# Patient Record
Sex: MALE | Race: WHITE | NOT HISPANIC OR LATINO | Employment: STUDENT | URBAN - METROPOLITAN AREA
[De-identification: names, ages, dates, MRNs, and addresses within clinical notes are randomized per-mention and may not be internally consistent; named-entity substitution may affect disease eponyms.]

---

## 2020-10-19 ENCOUNTER — LAB VISIT (OUTPATIENT)
Dept: LAB | Facility: HOSPITAL | Age: 20
End: 2020-10-19
Attending: STUDENT IN AN ORGANIZED HEALTH CARE EDUCATION/TRAINING PROGRAM
Payer: COMMERCIAL

## 2020-10-19 ENCOUNTER — OFFICE VISIT (OUTPATIENT)
Dept: FAMILY MEDICINE | Facility: CLINIC | Age: 20
End: 2020-10-19
Payer: COMMERCIAL

## 2020-10-19 VITALS
TEMPERATURE: 98 F | OXYGEN SATURATION: 97 % | RESPIRATION RATE: 18 BRPM | HEIGHT: 64 IN | WEIGHT: 224.44 LBS | BODY MASS INDEX: 38.32 KG/M2 | SYSTOLIC BLOOD PRESSURE: 130 MMHG | DIASTOLIC BLOOD PRESSURE: 84 MMHG | HEART RATE: 84 BPM

## 2020-10-19 DIAGNOSIS — Z02.5 SPORTS PHYSICAL: Primary | ICD-10-CM

## 2020-10-19 DIAGNOSIS — Z02.5 SPORTS PHYSICAL: ICD-10-CM

## 2020-10-19 DIAGNOSIS — Q93.89 JACOBSEN SYNDROME: ICD-10-CM

## 2020-10-19 DIAGNOSIS — F88 GLOBAL DEVELOPMENTAL DELAY: Chronic | ICD-10-CM

## 2020-10-19 DIAGNOSIS — E34.329 SHORT STATURE ASSOCIATED WITH GENETIC DISORDER: Chronic | ICD-10-CM

## 2020-10-19 LAB
ALBUMIN SERPL BCP-MCNC: 4 G/DL (ref 3.5–5.2)
ALP SERPL-CCNC: 66 U/L (ref 55–135)
ALT SERPL W/O P-5'-P-CCNC: 17 U/L (ref 10–44)
ANION GAP SERPL CALC-SCNC: 7 MMOL/L (ref 8–16)
AST SERPL-CCNC: 23 U/L (ref 10–40)
BASOPHILS # BLD AUTO: 0.01 K/UL (ref 0–0.2)
BASOPHILS NFR BLD: 0.1 % (ref 0–1.9)
BILIRUB SERPL-MCNC: 0.6 MG/DL (ref 0.1–1)
BUN SERPL-MCNC: 12 MG/DL (ref 6–20)
CALCIUM SERPL-MCNC: 9.7 MG/DL (ref 8.7–10.5)
CHLORIDE SERPL-SCNC: 105 MMOL/L (ref 95–110)
CHOLEST SERPL-MCNC: 172 MG/DL (ref 120–199)
CHOLEST/HDLC SERPL: 3.4 {RATIO} (ref 2–5)
CO2 SERPL-SCNC: 29 MMOL/L (ref 23–29)
CREAT SERPL-MCNC: 1 MG/DL (ref 0.5–1.4)
DIFFERENTIAL METHOD: ABNORMAL
EOSINOPHIL # BLD AUTO: 0 K/UL (ref 0–0.5)
EOSINOPHIL NFR BLD: 0.5 % (ref 0–8)
ERYTHROCYTE [DISTWIDTH] IN BLOOD BY AUTOMATED COUNT: 12.7 % (ref 11.5–14.5)
EST. GFR  (AFRICAN AMERICAN): >60 ML/MIN/1.73 M^2
EST. GFR  (NON AFRICAN AMERICAN): >60 ML/MIN/1.73 M^2
GLUCOSE SERPL-MCNC: 84 MG/DL (ref 70–110)
HCT VFR BLD AUTO: 49.7 % (ref 40–54)
HDLC SERPL-MCNC: 50 MG/DL (ref 40–75)
HDLC SERPL: 29.1 % (ref 20–50)
HGB BLD-MCNC: 15.8 G/DL (ref 14–18)
IMM GRANULOCYTES # BLD AUTO: 0.01 K/UL (ref 0–0.04)
IMM GRANULOCYTES NFR BLD AUTO: 0.1 % (ref 0–0.5)
INR PPP: 0.9 (ref 0.8–1.2)
LDLC SERPL CALC-MCNC: 76.2 MG/DL (ref 63–159)
LYMPHOCYTES # BLD AUTO: 1.3 K/UL (ref 1–4.8)
LYMPHOCYTES NFR BLD: 14.9 % (ref 18–48)
MCH RBC QN AUTO: 28.3 PG (ref 27–31)
MCHC RBC AUTO-ENTMCNC: 31.8 G/DL (ref 32–36)
MCV RBC AUTO: 89 FL (ref 82–98)
MONOCYTES # BLD AUTO: 0.4 K/UL (ref 0.3–1)
MONOCYTES NFR BLD: 5.2 % (ref 4–15)
NEUTROPHILS # BLD AUTO: 6.8 K/UL (ref 1.8–7.7)
NEUTROPHILS NFR BLD: 79.2 % (ref 38–73)
NONHDLC SERPL-MCNC: 122 MG/DL
NRBC BLD-RTO: 0 /100 WBC
PLATELET # BLD AUTO: 219 K/UL (ref 150–350)
PMV BLD AUTO: 11.8 FL (ref 9.2–12.9)
POTASSIUM SERPL-SCNC: 4.1 MMOL/L (ref 3.5–5.1)
PROT SERPL-MCNC: 7.2 G/DL (ref 6–8.4)
PROTHROMBIN TIME: 10.3 SEC (ref 9–12.5)
RBC # BLD AUTO: 5.59 M/UL (ref 4.6–6.2)
SODIUM SERPL-SCNC: 141 MMOL/L (ref 136–145)
TRIGL SERPL-MCNC: 229 MG/DL (ref 30–150)
TSH SERPL DL<=0.005 MIU/L-ACNC: 1.12 UIU/ML (ref 0.4–4)
WBC # BLD AUTO: 8.54 K/UL (ref 3.9–12.7)

## 2020-10-19 PROCEDURE — 99999 PR PBB SHADOW E&M-NEW PATIENT-LVL IV: ICD-10-PCS | Mod: PBBFAC,,, | Performed by: STUDENT IN AN ORGANIZED HEALTH CARE EDUCATION/TRAINING PROGRAM

## 2020-10-19 PROCEDURE — 93005 EKG 12-LEAD: ICD-10-PCS | Mod: S$GLB,,, | Performed by: STUDENT IN AN ORGANIZED HEALTH CARE EDUCATION/TRAINING PROGRAM

## 2020-10-19 PROCEDURE — 80053 COMPREHEN METABOLIC PANEL: CPT

## 2020-10-19 PROCEDURE — 99203 OFFICE O/P NEW LOW 30 MIN: CPT | Mod: S$GLB,,, | Performed by: STUDENT IN AN ORGANIZED HEALTH CARE EDUCATION/TRAINING PROGRAM

## 2020-10-19 PROCEDURE — 99203 PR OFFICE/OUTPT VISIT, NEW, LEVL III, 30-44 MIN: ICD-10-PCS | Mod: S$GLB,,, | Performed by: STUDENT IN AN ORGANIZED HEALTH CARE EDUCATION/TRAINING PROGRAM

## 2020-10-19 PROCEDURE — 93005 ELECTROCARDIOGRAM TRACING: CPT | Mod: S$GLB,,, | Performed by: STUDENT IN AN ORGANIZED HEALTH CARE EDUCATION/TRAINING PROGRAM

## 2020-10-19 PROCEDURE — 84443 ASSAY THYROID STIM HORMONE: CPT

## 2020-10-19 PROCEDURE — 85610 PROTHROMBIN TIME: CPT

## 2020-10-19 PROCEDURE — 85025 COMPLETE CBC W/AUTO DIFF WBC: CPT

## 2020-10-19 PROCEDURE — 93010 ELECTROCARDIOGRAM REPORT: CPT | Mod: S$GLB,,, | Performed by: INTERNAL MEDICINE

## 2020-10-19 PROCEDURE — 36415 COLL VENOUS BLD VENIPUNCTURE: CPT | Mod: PO

## 2020-10-19 PROCEDURE — 99999 PR PBB SHADOW E&M-NEW PATIENT-LVL IV: CPT | Mod: PBBFAC,,, | Performed by: STUDENT IN AN ORGANIZED HEALTH CARE EDUCATION/TRAINING PROGRAM

## 2020-10-19 PROCEDURE — 93010 EKG 12-LEAD: ICD-10-PCS | Mod: S$GLB,,, | Performed by: INTERNAL MEDICINE

## 2020-10-19 PROCEDURE — 80061 LIPID PANEL: CPT

## 2020-10-19 NOTE — PROGRESS NOTES
[facility] MEDICINE CLINIC NOTE    Patient Name: Dameon Smith  YOB: 2000    PRESENTING HISTORY   Chief Complaint:   Chief Complaint   Patient presents with    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 Previously seen in Pediatrics.     Comes to clinic today with his mother    Hx Laura Syndrome- Unclear manifestations. May have had some mild thrombocytopenia with nosebleeds but this resolved.    Was getting Echos as child but stopped. Notes do not go back this far.    Has short stature and slow intellectual development     Attempting to join the [employer] and needs pre-employment physical. Went to Urgent Care but felt he needed more extensive evaluation so referred to me.     Graduated High School from [employer] in 2020.     Chronic body rash- present for years. No itching, pain. Applied topical steroid in past. Never saw dermatology    Chronic ear infections. Discussed having facial surgery, didn't want to at first.     Review of Systems   HENT: Negative for nosebleeds and sore throat.         +recurrent ear aches   Respiratory: Negative for cough and shortness of breath.    Cardiovascular: Negative for chest pain and palpitations.   Skin: Positive for rash. Negative for itching.         PAST HISTORY:     Past Medical History:   Diagnosis Date    Laura Syndrome     (see docx from 3/25/13 in media for overview), may have increased risk of bleeding/bruising       History reviewed. No pertinent surgical history.    Family History   Problem Relation Age of Onset    Hypertension Mother     Hypertension Father     Cancer Maternal Grandmother     Heart disease Maternal Grandfather     Hyperlipidemia Paternal Grandmother     Hypertension Paternal Grandfather        Social History     Socioeconomic History    Marital status: Single     Spouse name: Not on file    Number of children: Not on file     Years of education: Not on file    Highest education level: Not on file   Occupational History    Not on file   Social Needs    Financial resource strain: Not on file    Food insecurity     Worry: Not on file     Inability: Not on file    Transportation needs     Medical: Not on file     Non-medical: Not on file   Tobacco Use    Smoking status: Never Smoker    Smokeless tobacco: Never Used   Substance and Sexual Activity    Alcohol use: No    Drug use: No    Sexual activity: Never   Lifestyle    Physical activity     Days per week: Not on file     Minutes per session: Not on file    Stress: Not on file   Relationships    Social connections     Talks on phone: Not on file     Gets together: Not on file     Attends M[employer] service: Not on file     Active member of club or organization: Not on file     Attends meetings of clubs or organizations: Not on file     Relationship status: Not on file   Other Topics Concern    Not on file   Social History Narrative    PMH:Laura syndrome, normal cardiac eval, ECHO and ECG, delayed speech and short stature; RAD and wheezd in  and ; occasional OM last in 3/09, strep in . He had chickenpox    FH:AODM in PGGM, MGGM, renal calculi in sister, dad and PGM, MGF had IHD in his 40s and pancreatic disease,  at 52; PGF with IHD and CABG in 60s, father has HTN, MGGM had melanoma at 32, PGGM had breast cancer, MGGF had mesothelioma from asbestosis    SH:intact family, youngest of six siblings, no smokers, two cats       MEDICATIONS & ALLERGIES:     No current outpatient medications on file prior to visit.     Current Facility-Administered Medications on File Prior to Visit   Medication    [DISCONTINUED] ibuprofen tablet 400 mg       Review of patient's allergies indicates:  No Known Allergies    OBJECTIVE:   Vital Signs:  Vitals:    10/19/20 1315   BP: 130/84   Pulse: 84   Resp: 18   Temp: 97.7 °F (36.5 °C)   TempSrc: Temporal   SpO2: 97%   Weight:  "101.8 kg (224 lb 6.9 oz)   Height: 5' 4" (1.626 m)       No results found for this or any previous visit (from the past 24 hour(s)).      Physical Exam   Constitutional: He is oriented to person, place, and time.   Short stature. No distress. No diaphoresis. Well appearing   HENT:   Right Ear: External ear normal.   Left Ear: External ear normal.   Mouth/Throat: Oropharynx is clear and moist.   Craniofacial abnormalities. Deviated septum.    Eyes: Pupils are equal, round, and reactive to light. Conjunctivae and EOM are normal. No scleral icterus.   Neck: Normal range of motion. Neck supple. No thyromegaly present.   Cardiovascular: Normal rate, regular rhythm, normal heart sounds and intact distal pulses.   No murmur heard.  Pulmonary/Chest: Effort normal and breath sounds normal. No respiratory distress. He has no wheezes. He has no rales.   Abdominal: Soft. Bowel sounds are normal. He exhibits no distension. There is no abdominal tenderness.   Musculoskeletal: Normal range of motion.         General: No tenderness, deformity or edema.      Comments: Normal squat   Lymphadenopathy:     He has no cervical adenopathy.   Neurological: He is alert and oriented to person, place, and time. He displays normal reflexes. No cranial nerve deficit. He exhibits normal muscle tone. Gait normal. Coordination (normal finger to nose) normal. GCS score is 15.   Able to balance on one leg bilaterally.   Normal rhomberg     Skin: Skin is warm and dry. No rash noted. No erythema.   Mutliple discrete cystic lesions with erythema overlying on back and chest.    Psychiatric: Mood, memory, affect and judgment normal.   Nursing note and vitals reviewed.      ASSESSMENT & PLAN:     20 M with genetic disease here for [employer] physical. He has mild cognitive deficits but has completed high school. Mild structural impairments but appears capable of listed physical demands.     On review, his condition is associated with bleeding disorders, " although this appears to have improved.   No obvious sign of cardiac disease, murmurs.     Getting CBC, INR.   Echo, EKG    Sports physical  -     CBC auto differential; Future; Expected date: 10/19/2020  -     Comprehensive Metabolic Panel; Future; Expected date: 10/19/2020  -     Lipid Panel; Future; Expected date: 10/19/2020  -     TSH; Future; Expected date: 10/19/2020  -     Protime-INR; Future; Expected date: 10/19/2020  -     IN OFFICE EKG 12-LEAD (to Muse)  -     Echo Color Flow Doppler? Yes; Future  -     Ambulatory referral/consult to Occupational Medicine; Future; Expected date: 10/26/2020    Laura Syndrome  As above    Short stature associated with genetic disorder  As above    Global developmental delay  As above      Navjot Angulo MD

## 2020-11-09 ENCOUNTER — TELEPHONE (OUTPATIENT)
Dept: CARDIOLOGY | Facility: HOSPITAL | Age: 20
End: 2020-11-09

## 2020-11-10 ENCOUNTER — CLINICAL SUPPORT (OUTPATIENT)
Dept: CARDIOLOGY | Facility: HOSPITAL | Age: 20
End: 2020-11-10
Attending: STUDENT IN AN ORGANIZED HEALTH CARE EDUCATION/TRAINING PROGRAM
Payer: COMMERCIAL

## 2020-11-10 VITALS — BODY MASS INDEX: 38.32 KG/M2 | WEIGHT: 224.44 LBS | HEIGHT: 64 IN

## 2020-11-10 DIAGNOSIS — Z02.5 SPORTS PHYSICAL: ICD-10-CM

## 2020-11-10 PROCEDURE — 93306 ECHO (CUPID ONLY): ICD-10-PCS | Mod: 26,,, | Performed by: INTERNAL MEDICINE

## 2020-11-10 PROCEDURE — 93306 TTE W/DOPPLER COMPLETE: CPT | Mod: 26,,, | Performed by: INTERNAL MEDICINE

## 2020-11-10 PROCEDURE — 93306 TTE W/DOPPLER COMPLETE: CPT

## 2020-11-11 LAB
AORTIC ROOT ANNULUS: 2.97 CM
AORTIC VALVE CUSP SEPERATION: 2.17 CM
AV INDEX (PROSTH): 0.98
AV MEAN GRADIENT: 4 MMHG
AV PEAK GRADIENT: 7 MMHG
AV VALVE AREA: 3.08 CM2
AV VELOCITY RATIO: 93.98
BSA FOR ECHO PROCEDURE: 2.14 M2
CV ECHO LV RWT: 0.42 CM
DOP CALC AO PEAK VEL: 1.29 M/S
DOP CALC AO VTI: 19.9 CM
DOP CALC LVOT AREA: 3.1 CM2
DOP CALC LVOT DIAMETER: 2 CM
DOP CALC LVOT PEAK VEL: 121.24 M/S
DOP CALC LVOT STROKE VOLUME: 61.23 CM3
DOP CALCLVOT PEAK VEL VTI: 19.5 CM
E WAVE DECELERATION TIME: 133.41 MSEC
E/A RATIO: 1.43
E/E' RATIO: 5.93 M/S
ECHO LV POSTERIOR WALL: 0.93 CM (ref 0.6–1.1)
FRACTIONAL SHORTENING: 28 % (ref 28–44)
INTERVENTRICULAR SEPTUM: 0.92 CM (ref 0.6–1.1)
IVRT: 55.25 MSEC
LEFT ATRIUM SIZE: 3.3 CM
LEFT INTERNAL DIMENSION IN SYSTOLE: 3.23 CM (ref 2.1–4)
LEFT VENTRICLE MASS INDEX: 67 G/M2
LEFT VENTRICULAR INTERNAL DIMENSION IN DIASTOLE: 4.48 CM (ref 3.5–6)
LEFT VENTRICULAR MASS: 136.82 G
LV LATERAL E/E' RATIO: 5 M/S
LV SEPTAL E/E' RATIO: 7.27 M/S
MV PEAK A VEL: 0.56 M/S
MV PEAK E VEL: 0.8 M/S
PV PEAK VELOCITY: 120.59 CM/S
RIGHT VENTRICULAR END-DIASTOLIC DIMENSION: 248 CM
TDI LATERAL: 0.16 M/S
TDI SEPTAL: 0.11 M/S
TDI: 0.14 M/S

## 2020-11-22 ENCOUNTER — PATIENT MESSAGE (OUTPATIENT)
Dept: FAMILY MEDICINE | Facility: CLINIC | Age: 20
End: 2020-11-22

## 2020-12-03 ENCOUNTER — PATIENT MESSAGE (OUTPATIENT)
Dept: FAMILY MEDICINE | Facility: CLINIC | Age: 20
End: 2020-12-03

## 2020-12-08 ENCOUNTER — PATIENT MESSAGE (OUTPATIENT)
Dept: FAMILY MEDICINE | Facility: CLINIC | Age: 20
End: 2020-12-08

## 2021-04-29 ENCOUNTER — PATIENT MESSAGE (OUTPATIENT)
Dept: RESEARCH | Facility: HOSPITAL | Age: 21
End: 2021-04-29

## 2021-09-10 ENCOUNTER — TELEPHONE (OUTPATIENT)
Dept: FAMILY MEDICINE | Facility: CLINIC | Age: 21
End: 2021-09-10

## 2021-09-10 DIAGNOSIS — U07.1 COVID-19: Primary | ICD-10-CM

## 2021-09-11 ENCOUNTER — INFUSION (OUTPATIENT)
Dept: INFECTIOUS DISEASES | Facility: HOSPITAL | Age: 21
End: 2021-09-11
Attending: STUDENT IN AN ORGANIZED HEALTH CARE EDUCATION/TRAINING PROGRAM
Payer: COMMERCIAL

## 2021-09-11 VITALS
SYSTOLIC BLOOD PRESSURE: 158 MMHG | BODY MASS INDEX: 38.24 KG/M2 | TEMPERATURE: 98 F | HEART RATE: 84 BPM | RESPIRATION RATE: 20 BRPM | WEIGHT: 224 LBS | OXYGEN SATURATION: 99 % | DIASTOLIC BLOOD PRESSURE: 100 MMHG | HEIGHT: 64 IN

## 2021-09-11 DIAGNOSIS — U07.1 COVID-19: ICD-10-CM

## 2021-09-11 PROCEDURE — 25000003 PHARM REV CODE 250: Performed by: STUDENT IN AN ORGANIZED HEALTH CARE EDUCATION/TRAINING PROGRAM

## 2021-09-11 PROCEDURE — 63600175 PHARM REV CODE 636 W HCPCS: Performed by: STUDENT IN AN ORGANIZED HEALTH CARE EDUCATION/TRAINING PROGRAM

## 2021-09-11 PROCEDURE — M0243 CASIRIVI AND IMDEVI INFUSION: HCPCS | Performed by: STUDENT IN AN ORGANIZED HEALTH CARE EDUCATION/TRAINING PROGRAM

## 2021-09-11 RX ORDER — ACETAMINOPHEN 325 MG/1
650 TABLET ORAL ONCE AS NEEDED
Status: DISCONTINUED | OUTPATIENT
Start: 2021-09-11 | End: 2022-11-21 | Stop reason: ALTCHOICE

## 2021-09-11 RX ORDER — ALBUTEROL SULFATE 90 UG/1
2 AEROSOL, METERED RESPIRATORY (INHALATION)
Status: DISCONTINUED | OUTPATIENT
Start: 2021-09-11 | End: 2022-11-21 | Stop reason: ALTCHOICE

## 2021-09-11 RX ORDER — ONDANSETRON 4 MG/1
4 TABLET, ORALLY DISINTEGRATING ORAL ONCE AS NEEDED
Status: DISCONTINUED | OUTPATIENT
Start: 2021-09-11 | End: 2022-11-21 | Stop reason: ALTCHOICE

## 2021-09-11 RX ORDER — SODIUM CHLORIDE 0.9 % (FLUSH) 0.9 %
10 SYRINGE (ML) INJECTION
Status: DISCONTINUED | OUTPATIENT
Start: 2021-09-11 | End: 2022-11-21 | Stop reason: ALTCHOICE

## 2021-09-11 RX ORDER — EPINEPHRINE 0.3 MG/.3ML
0.3 INJECTION SUBCUTANEOUS
Status: DISCONTINUED | OUTPATIENT
Start: 2021-09-11 | End: 2022-11-21 | Stop reason: ALTCHOICE

## 2021-09-11 RX ORDER — DIPHENHYDRAMINE HYDROCHLORIDE 50 MG/ML
25 INJECTION INTRAMUSCULAR; INTRAVENOUS ONCE AS NEEDED
Status: DISCONTINUED | OUTPATIENT
Start: 2021-09-11 | End: 2022-11-21 | Stop reason: ALTCHOICE

## 2021-09-11 RX ADMIN — SODIUM CHLORIDE: 0.9 INJECTION, SOLUTION INTRAVENOUS at 10:09

## 2021-09-11 RX ADMIN — CASIRIVIMAB AND IMDEVIMAB 600 MG: 600; 600 INJECTION, SOLUTION, CONCENTRATE INTRAVENOUS at 10:09

## 2021-09-15 ENCOUNTER — TELEPHONE (OUTPATIENT)
Dept: SPORTS MEDICINE | Facility: CLINIC | Age: 21
End: 2021-09-15

## 2022-05-31 ENCOUNTER — PATIENT MESSAGE (OUTPATIENT)
Dept: ADMINISTRATIVE | Facility: HOSPITAL | Age: 22
End: 2022-05-31
Payer: COMMERCIAL

## 2022-08-01 ENCOUNTER — TELEPHONE (OUTPATIENT)
Dept: FAMILY MEDICINE | Facility: CLINIC | Age: 22
End: 2022-08-01
Payer: COMMERCIAL

## 2022-08-01 NOTE — TELEPHONE ENCOUNTER
----- Message from Shaunna Hernandez, Patient Care Assistant sent at 8/1/2022  9:36 AM CDT -----  Regarding: advice  Contact: pt's mother piotr  Type: Needs Medical Advice  Who Called:  pt   Best Call Back Number: [phone]    Additional Information: pt states she would like a callback regarding advise on his nose surgery. Please call pt to advise. Thanks!

## 2022-08-01 NOTE — TELEPHONE ENCOUNTER
Patient's mother (Deirdre) states in the past patient was advised by Dr. Stephenson he needed surgery on his nose. States he was afraid to have the procedure done at that time, but would like to have it done now. Writer advised for patient to contact the physician who advised patient of need for surgery to schedule an appointment to ensure surgery is still an option for patient's condition. Also advised if surgery is scheduled to contact family medicine to schedule a pre-op surgery clearance appointment. Mrs. Gilmore verbalized understanding.

## 2024-12-17 ENCOUNTER — OFFICE VISIT (OUTPATIENT)
Dept: FAMILY MEDICINE | Facility: CLINIC | Age: 24
End: 2024-12-17
Payer: COMMERCIAL

## 2024-12-17 VITALS
BODY MASS INDEX: 39.06 KG/M2 | WEIGHT: 220.44 LBS | HEIGHT: 63 IN | SYSTOLIC BLOOD PRESSURE: 146 MMHG | DIASTOLIC BLOOD PRESSURE: 86 MMHG | HEART RATE: 97 BPM | TEMPERATURE: 98 F | OXYGEN SATURATION: 97 %

## 2024-12-17 DIAGNOSIS — Z23 NEED FOR VACCINATION: ICD-10-CM

## 2024-12-17 DIAGNOSIS — Z00.00 PHYSICAL EXAM: Primary | ICD-10-CM

## 2024-12-17 DIAGNOSIS — Z87.81 HISTORY OF FRACTURE OF NASAL BONE: ICD-10-CM

## 2024-12-17 DIAGNOSIS — I10 HYPERTENSION, ESSENTIAL: ICD-10-CM

## 2024-12-17 DIAGNOSIS — Z98.890 HISTORY OF NASAL SURGERY: ICD-10-CM

## 2024-12-17 DIAGNOSIS — Q93.89 JACOBSEN SYNDROME: Chronic | ICD-10-CM

## 2024-12-17 DIAGNOSIS — F88 GLOBAL DEVELOPMENTAL DELAY: Chronic | ICD-10-CM

## 2024-12-17 PROCEDURE — 99999 PR PBB SHADOW E&M-EST. PATIENT-LVL IV: CPT | Mod: PBBFAC,,, | Performed by: FAMILY MEDICINE

## 2024-12-17 RX ORDER — AMLODIPINE BESYLATE 5 MG/1
5 TABLET ORAL DAILY
Qty: 30 TABLET | Refills: 1 | Status: SHIPPED | OUTPATIENT
Start: 2024-12-17 | End: 2025-12-17

## 2024-12-17 NOTE — PATIENT INSTRUCTIONS
Norvasc 5 mg - All medications will be sent to pharm after 5:30 pm today     Fasting labs 8 hr - 1st floor, left of elevator @ patient registration    (open 7:30 am - 5 pm week days)

## 2024-12-18 ENCOUNTER — LAB VISIT (OUTPATIENT)
Dept: LAB | Facility: HOSPITAL | Age: 24
End: 2024-12-18
Attending: FAMILY MEDICINE
Payer: COMMERCIAL

## 2024-12-18 DIAGNOSIS — Z00.00 PHYSICAL EXAM: ICD-10-CM

## 2024-12-18 LAB
ALBUMIN SERPL BCP-MCNC: 4.6 G/DL (ref 3.5–5.2)
ALP SERPL-CCNC: 50 U/L (ref 55–135)
ALT SERPL W/O P-5'-P-CCNC: 14 U/L (ref 10–44)
ANION GAP SERPL CALC-SCNC: 3 MMOL/L (ref 8–16)
AST SERPL-CCNC: 18 U/L (ref 10–40)
BASOPHILS # BLD AUTO: 0 K/UL (ref 0–0.2)
BASOPHILS NFR BLD: 0 % (ref 0–1.9)
BILIRUB SERPL-MCNC: 1 MG/DL (ref 0.1–1)
BUN SERPL-MCNC: 16 MG/DL (ref 6–20)
CALCIUM SERPL-MCNC: 9.5 MG/DL (ref 8.7–10.5)
CHLORIDE SERPL-SCNC: 104 MMOL/L (ref 95–110)
CHOLEST SERPL-MCNC: 175 MG/DL (ref 120–199)
CHOLEST/HDLC SERPL: 2.5 {RATIO} (ref 2–5)
CO2 SERPL-SCNC: 33 MMOL/L (ref 23–29)
CREAT SERPL-MCNC: 0.9 MG/DL (ref 0.5–1.4)
DIFFERENTIAL METHOD BLD: ABNORMAL
EOSINOPHIL # BLD AUTO: 0.1 K/UL (ref 0–0.5)
EOSINOPHIL NFR BLD: 0.8 % (ref 0–8)
ERYTHROCYTE [DISTWIDTH] IN BLOOD BY AUTOMATED COUNT: 12.5 % (ref 11.5–14.5)
EST. GFR  (NO RACE VARIABLE): >60 ML/MIN/1.73 M^2
ESTIMATED AVG GLUCOSE: 100 MG/DL (ref 68–131)
GLUCOSE SERPL-MCNC: 104 MG/DL (ref 70–110)
HBA1C MFR BLD: 5.1 % (ref 4.5–6.2)
HCT VFR BLD AUTO: 48.5 % (ref 40–54)
HDLC SERPL-MCNC: 69 MG/DL (ref 40–75)
HDLC SERPL: 39.4 % (ref 20–50)
HGB BLD-MCNC: 16.4 G/DL (ref 14–18)
IMM GRANULOCYTES # BLD AUTO: 0.02 K/UL (ref 0–0.04)
IMM GRANULOCYTES NFR BLD AUTO: 0.3 % (ref 0–0.5)
LDLC SERPL CALC-MCNC: 89.4 MG/DL (ref 63–159)
LYMPHOCYTES # BLD AUTO: 1.1 K/UL (ref 1–4.8)
LYMPHOCYTES NFR BLD: 17.4 % (ref 18–48)
MCH RBC QN AUTO: 29.2 PG (ref 27–31)
MCHC RBC AUTO-ENTMCNC: 33.8 G/DL (ref 32–36)
MCV RBC AUTO: 87 FL (ref 82–98)
MONOCYTES # BLD AUTO: 0.3 K/UL (ref 0.3–1)
MONOCYTES NFR BLD: 5.2 % (ref 4–15)
NEUTROPHILS # BLD AUTO: 5 K/UL (ref 1.8–7.7)
NEUTROPHILS NFR BLD: 76.3 % (ref 38–73)
NONHDLC SERPL-MCNC: 106 MG/DL
NRBC BLD-RTO: 0 /100 WBC
PLATELET # BLD AUTO: 171 K/UL (ref 150–450)
PMV BLD AUTO: 10.8 FL (ref 9.2–12.9)
POTASSIUM SERPL-SCNC: 4.3 MMOL/L (ref 3.5–5.1)
PROT SERPL-MCNC: 7.1 G/DL (ref 6–8.4)
RBC # BLD AUTO: 5.61 M/UL (ref 4.6–6.2)
SODIUM SERPL-SCNC: 140 MMOL/L (ref 136–145)
TRIGL SERPL-MCNC: 83 MG/DL (ref 30–150)
TSH SERPL DL<=0.005 MIU/L-ACNC: 1.34 UIU/ML (ref 0.34–5.6)
WBC # BLD AUTO: 6.54 K/UL (ref 3.9–12.7)

## 2024-12-18 PROCEDURE — 84443 ASSAY THYROID STIM HORMONE: CPT | Performed by: FAMILY MEDICINE

## 2024-12-18 PROCEDURE — 83036 HEMOGLOBIN GLYCOSYLATED A1C: CPT | Performed by: FAMILY MEDICINE

## 2024-12-18 PROCEDURE — 80061 LIPID PANEL: CPT | Performed by: FAMILY MEDICINE

## 2024-12-18 PROCEDURE — 80053 COMPREHEN METABOLIC PANEL: CPT | Performed by: FAMILY MEDICINE

## 2024-12-18 PROCEDURE — 85025 COMPLETE CBC W/AUTO DIFF WBC: CPT | Performed by: FAMILY MEDICINE

## 2024-12-20 PROBLEM — I10 HYPERTENSION, ESSENTIAL: Status: ACTIVE | Noted: 2024-12-20

## 2024-12-20 PROBLEM — Z87.81 HISTORY OF FRACTURE OF NASAL BONE: Status: ACTIVE | Noted: 2024-12-20

## 2024-12-21 NOTE — PROGRESS NOTES
Subjective:       Patient ID: Dameon Smith is a 24 y.o. male.    Chief Complaint: Establish Care    Here for new patient visit.    Social history.  Nonsmoker no alcohol intake.  Works at a [employer].    Family history.  Hypertension mother and father.  Lung cancer maternal grandmother.  Paternal grandfather leukemia paternal grandmother pancreatic cancer.    Immunizations due for TD AP.  Declines flu shot.      Past medical history.  Check ups and syndrome.  Nasal fracture and nasal surgery previously.  BMI of 39.1.  Weight has been increasing.      Review of Systems   Constitutional:  Positive for unexpected weight change.   HENT: Negative.     Eyes: Negative.    Respiratory: Negative.     Cardiovascular: Negative.    Gastrointestinal: Negative.    Endocrine: Negative.    Genitourinary: Negative.    Musculoskeletal: Negative.    Skin: Negative.    Neurological: Negative.    Hematological: Negative.    Psychiatric/Behavioral: Negative.         Objective:      Physical Exam  Vitals reviewed.   Constitutional:       Appearance: Normal appearance. He is well-developed. He is obese.   HENT:      Head: Normocephalic and atraumatic.      Right Ear: Tympanic membrane normal.      Left Ear: Tympanic membrane normal.      Nose: Nose normal.      Mouth/Throat:      Mouth: Mucous membranes are moist.      Pharynx: No oropharyngeal exudate.   Eyes:      Conjunctiva/sclera: Conjunctivae normal.      Pupils: Pupils are equal, round, and reactive to light.   Neck:      Vascular: No carotid bruit.   Cardiovascular:      Rate and Rhythm: Normal rate and regular rhythm.      Pulses: Normal pulses.      Heart sounds: Normal heart sounds. No murmur heard.     No gallop.   Pulmonary:      Effort: Pulmonary effort is normal.      Breath sounds: Normal breath sounds.   Abdominal:      General: Bowel sounds are normal.      Palpations: Abdomen is soft. There is no mass.      Tenderness: There is no abdominal tenderness.   Musculoskeletal:          General: Normal range of motion.      Cervical back: Normal range of motion.   Skin:     General: Skin is warm and dry.   Neurological:      General: No focal deficit present.      Mental Status: He is alert and oriented to person, place, and time.   Psychiatric:         Mood and Affect: Mood normal.         Behavior: Behavior normal.         Assessment:       1. Physical exam    2. Laura Syndrome    3. Global developmental delay    4. History of nasal surgery    5. History of fracture of nasal bone    6. BMI 39.0-39.9,adult    7. Need for vaccination    8. Hypertension, essential        Plan:       Physical exam  -     CBC Auto Differential; Future; Expected date: 12/17/2024  -     Comprehensive Metabolic Panel; Future; Expected date: 12/17/2024  -     Lipid Panel; Future; Expected date: 12/17/2024  -     Hemoglobin A1C; Future; Expected date: 12/17/2024  -     TSH; Future; Expected date: 12/17/2024    Laura Syndrome    Global developmental delay    History of nasal surgery    History of fracture of nasal bone    BMI 39.0-39.9,adult  -     CBC Auto Differential; Future; Expected date: 12/17/2024  -     Comprehensive Metabolic Panel; Future; Expected date: 12/17/2024  -     Lipid Panel; Future; Expected date: 12/17/2024  -     Hemoglobin A1C; Future; Expected date: 12/17/2024  -     TSH; Future; Expected date: 12/17/2024    Need for vaccination  -     Tdap (BOOSTRIX) vaccine injection 0.5 mL    Hypertension, essential    Other orders  -     amLODIPine (NORVASC) 5 MG tablet; Take 1 tablet (5 mg total) by mouth once daily.  Dispense: 30 tablet; Refill: 1    TD AP.  Declines flu shot.  CBC CMP lipids A1c TSH.    In addition to routine physical.  Has been found to have hypertension recently.  No chest pain no palpitations.    Physical examination.  Vital signs noted.  No acute distress neck without bruit.  Chest clear.  Heart regular rate and rhythm.  Extremities without edema.      Impression.  Essential  hypertension.      Plan Norvasc 5 mg daily 30 pills.  Follow-up regarding blood pressure in 10 days.

## 2024-12-30 ENCOUNTER — OFFICE VISIT (OUTPATIENT)
Dept: FAMILY MEDICINE | Facility: CLINIC | Age: 24
End: 2024-12-30
Payer: COMMERCIAL

## 2024-12-30 VITALS
TEMPERATURE: 98 F | BODY MASS INDEX: 38.62 KG/M2 | SYSTOLIC BLOOD PRESSURE: 122 MMHG | DIASTOLIC BLOOD PRESSURE: 88 MMHG | HEART RATE: 87 BPM | HEIGHT: 63 IN | WEIGHT: 217.94 LBS

## 2024-12-30 DIAGNOSIS — I10 HYPERTENSION, ESSENTIAL: Primary | ICD-10-CM

## 2024-12-30 DIAGNOSIS — G47.10 HYPERSOMNOLENCE: ICD-10-CM

## 2024-12-30 DIAGNOSIS — Z87.81 HISTORY OF FRACTURE OF NASAL BONE: ICD-10-CM

## 2024-12-30 DIAGNOSIS — R06.83 SNORING: ICD-10-CM

## 2024-12-30 PROCEDURE — 99213 OFFICE O/P EST LOW 20 MIN: CPT | Mod: S$GLB,,, | Performed by: NURSE PRACTITIONER

## 2024-12-30 PROCEDURE — 99999 PR PBB SHADOW E&M-EST. PATIENT-LVL IV: CPT | Mod: PBBFAC,,, | Performed by: NURSE PRACTITIONER

## 2024-12-30 NOTE — PATIENT INSTRUCTIONS
Lifestyle changes for high blood pressure:     -Eat at least 5 servings of fruits and vegetables per day.  -Each meal should consist of 50% fruits and vegetables like the diagram below:                  -AVOID The Salty Six:    AVOID THE FOODS BELOW      -Exercise such as walking 30 minutes a day at least 3 days a week.    -Limit caffeinated beverages to 1 glass per day.    -ELIMINATE ALL OVER THE COUNTER ANTI-INFLAMMATORY DRUGS, only take if you really need it:   This includes:    -Ibuprofen    -aleve      -motrin    -advil    -naproxen      -Asprin ONLY if instructed by your doctor  TYLENOL IS OKAY. TAKE THIS IF YOU HAVE PAIN OR FEVER.

## 2024-12-30 NOTE — PROGRESS NOTES
Subjective:       Patient ID: Dameon Smith is a 24 y.o. male.    Chief Complaint: Follow-up    HPI      Pt presents for HTN follow up. Currently taking amlodipine 5mgqd. Taking all medications as prescribed and tolerating well.     Essential Hypertension questions:    Sleep apnea risk?: yes, snoring, past hx of deviated septum reconstruction.       12/30/2024   EPWORTH SLEEPINESS SCALE   Sitting and reading 3   Watching TV 3   Sitting, inactive in a public place (e.g. a theatre or a meeting) 1   As a passenger in a car for an hour without a break 0   Lying down to rest in the afternoon when circumstances permit 0   Sitting and talking to someone 0   Sitting quietly after a lunch without alcohol 3   In a car, while stopped for a few minutes in traffic 0   Total score 10      Excessive NSAID use?: no  Excessive caffeine intake?:  moderate; admits to drinking 1 energy drink and 1-2 caffeine soft drinks per day  Excessive sodium intake?: yes; admits to eating excess processed foods    Pt denies HA, vision changes, LE edema, palpitations, dizziness, lightheadedness, LE edema.    Review of patient's allergies indicates:  No Known Allergies  Social Drivers of Health     Tobacco Use: Low Risk  (12/17/2024)    Patient History     Smoking Tobacco Use: Never     Smokeless Tobacco Use: Never     Passive Exposure: Not on file   Alcohol Use: Not on file   Financial Resource Strain: Not on file   Food Insecurity: Not on file   Transportation Needs: Not on file   Physical Activity: Not on file   Stress: Not on file   Housing Stability: Not on file   Depression: High Risk (12/17/2024)    Depression     Last PHQ-4: Flowsheet Data: 11   Utilities: Not on file   Health Literacy: Not on file   Social Isolation: Not on file      Past Medical History:   Diagnosis Date    Laura Syndrome     (see docx from 3/25/13 in media for overview), may have increased risk of bleeding/bruising      Past Surgical History:   Procedure Laterality  "Date    NASAL SEPTOPLASTY Bilateral 11/23/2022    Procedure: SEPTOPLASTY, NOSE;  Surgeon: Osvaldo Love MD;  Location: [facility];  Service: ENT;  Laterality: Bilateral;      Social History     Socioeconomic History    Marital status: Single         Current Outpatient Medications:     amLODIPine (NORVASC) 5 MG tablet, Take 1 tablet (5 mg total) by mouth once daily., Disp: 30 tablet, Rfl: 1    Review of Systems   Constitutional:  Negative for chills and fever.   Eyes:  Negative for visual disturbance.   Respiratory:  Negative for shortness of breath.    Cardiovascular:  Negative for chest pain, palpitations and leg swelling.   Gastrointestinal:  Negative for abdominal pain and constipation.   Neurological:  Negative for dizziness, light-headedness and headaches.   Psychiatric/Behavioral:  Negative for suicidal ideas.        Objective:      Vitals:    12/30/24 1557 12/30/24 1631   BP: (!) 150/80 122/88   BP Location:  Right arm   Patient Position:  Sitting   Pulse: 87    Temp: 97.9 °F (36.6 °C)    TempSrc: Oral    Weight: 98.9 kg (217 lb 14.8 oz)    Height: 5' 3" (1.6 m)     Recheck 122/88  Physical Exam  Constitutional:       Appearance: Normal appearance. He is obese.   HENT:      Head: Normocephalic and atraumatic.   Eyes:      Conjunctiva/sclera: Conjunctivae normal.   Cardiovascular:      Rate and Rhythm: Normal rate and regular rhythm.      Heart sounds: Normal heart sounds.   Pulmonary:      Effort: Pulmonary effort is normal. No respiratory distress.      Breath sounds: Normal breath sounds. No wheezing.   Abdominal:      General: There is no distension.      Palpations: There is no mass.      Tenderness: There is no abdominal tenderness.   Musculoskeletal:      Right lower leg: No edema.      Left lower leg: No edema.   Skin:     Findings: No erythema.   Neurological:      Mental Status: He is alert and oriented to person, place, and time.   Psychiatric:         Mood and Affect: Mood normal.         " "Behavior: Behavior normal.         Assessment:       1. Hypertension, essential    2. Hypersomnolence    3. Snoring    4. History of fracture of nasal bone    5. BMI 38.0-38.9,adult        Plan:       Dameon ZELAYA "Dameon" was seen today for follow-up.    Diagnoses and all orders for this visit:    Hypertension, essential  -     Home Sleep Study; Future  Stable, continue current medications     -Discussed lifestyle modifications to treat htn    Hypersomnolence  -     Home Sleep Study; Future    Snoring  -     Home Sleep Study; Future  Instructed to check BP periodically at home, if consistently above 140/80, return to clinic.       Discussed patient's due or missing age appropriate immunizations.  Pt is aware of risks and benefits of these vaccines and DECLINES all vaccinations today.   Discussed and addressed all care gaps.  Patient verbalized understanding and agrees with plan.            "

## 2025-01-09 RX ORDER — AMLODIPINE BESYLATE 5 MG/1
5 TABLET ORAL
Qty: 90 TABLET | Refills: 0 | Status: SHIPPED | OUTPATIENT
Start: 2025-01-09

## 2025-01-09 NOTE — TELEPHONE ENCOUNTER
No care due was identified.  Health [facility] Embedded Care Due Messages. Reference number: [id number].   1/09/2025 9:31:51 AM CST

## 2025-02-18 ENCOUNTER — OCCUPATIONAL HEALTH (OUTPATIENT)
Dept: URGENT CARE | Facility: CLINIC | Age: 25
End: 2025-02-18

## 2025-02-18 DIAGNOSIS — Z02.83 ENCOUNTER FOR DRUG SCREENING: Primary | ICD-10-CM

## 2025-02-27 ENCOUNTER — OCCUPATIONAL HEALTH (OUTPATIENT)
Dept: URGENT CARE | Facility: CLINIC | Age: 25
End: 2025-02-27

## 2025-04-11 RX ORDER — AMLODIPINE BESYLATE 5 MG/1
5 TABLET ORAL
Qty: 90 TABLET | Refills: 2 | Status: SHIPPED | OUTPATIENT
Start: 2025-04-11

## 2025-04-11 NOTE — TELEPHONE ENCOUNTER
Refill Decision Note   Dameon Smith  is requesting a refill authorization.  Brief Assessment and Rationale for Refill:  Approve     Medication Therapy Plan:         Comments:     Note composed:4:15 AM 04/11/2025

## 2025-04-11 NOTE — TELEPHONE ENCOUNTER
No care due was identified.  Health [facility] Embedded Care Due Messages. Reference number: [id number].   4/11/2025 12:09:20 AM CDT